# Patient Record
Sex: FEMALE | URBAN - METROPOLITAN AREA
[De-identification: names, ages, dates, MRNs, and addresses within clinical notes are randomized per-mention and may not be internally consistent; named-entity substitution may affect disease eponyms.]

---

## 2017-07-11 ENCOUNTER — APPOINTMENT (RX ONLY)
Dept: URBAN - METROPOLITAN AREA CLINIC 75 | Facility: CLINIC | Age: 47
Setting detail: DERMATOLOGY
End: 2017-07-11

## 2017-07-11 VITALS — HEIGHT: 64 IN | WEIGHT: 180 LBS

## 2017-07-11 DIAGNOSIS — L81.1 CHLOASMA: ICD-10-CM

## 2017-07-11 PROCEDURE — ? COUNSELING

## 2017-07-11 PROCEDURE — ? TREATMENT REGIMEN

## 2017-07-11 PROCEDURE — ? IN-HOUSE DISPENSING PHARMACY

## 2017-07-11 PROCEDURE — 99202 OFFICE O/P NEW SF 15 MIN: CPT

## 2017-07-11 PROCEDURE — ? RECOMMENDATIONS

## 2017-07-11 ASSESSMENT — LOCATION ZONE DERM: LOCATION ZONE: FACE

## 2017-07-11 ASSESSMENT — LOCATION DETAILED DESCRIPTION DERM: LOCATION DETAILED: LEFT INFERIOR CENTRAL MALAR CHEEK

## 2017-07-11 ASSESSMENT — LOCATION SIMPLE DESCRIPTION DERM: LOCATION SIMPLE: LEFT CHEEK

## 2017-07-11 NOTE — PROCEDURE: IN-HOUSE DISPENSING PHARMACY
Product 47 Price/Unit (In Dollars): 0
Product 15 Refills: 6
Product 68 Unit Type: mg
Product 7 Application Directions: Apply to affected area one time a day.
Name Of Product 51: Terb 5%/ DMSO Anti- Fungal Nail Solution - 982374
Product 11 Unit Type: cc's
Product 6 Amount/Unit (Numbers Only): 30
Name Of Product 8: Taza 0.1% Cream - 429442
Product 2 Application Directions: Apply to affected area in the evening after moisturizer.  Avoid eyelids.
Product 41 Application Directions: Apply to hyperpigmented area in the evening after moisturizer.
Name Of Product 13: Clob 0.05% Cream - 645443
Name Of Product 2: Tret 0.05% Cream - 870032
Product 1 Amount/Unit (Numbers Only): 120
Product 1 Price/Unit (In Dollars): 40.00
Product 35 Price/Unit (In Dollars): 50.00
Render Refills If Set To 0: Yes
Product 4 Refills: 11
Product 31 Amount/Unit (Numbers Only): 30
Name Of Product 11: Clob 0.05% Solution - 362228
Detail Level: Zone
Product 6 Unit Type: grams
Product 8 Application Directions: Apply to affected area in the evening after moisturizer.  Avoid eyelids.
Name Of Product 1: Sod Sulfa Body Wash - 166053
Product 8 Refills: 11
Product 13 Application Directions: Apply to affected area two times a day.
Product 3 Application Directions: Apply to acne prone area after moisturizer one time a day.
Product 51 Application Directions: Apply to affected nails daily for 10 months.
Product 5 Application Directions: Apply to affected area in evening after moisturizer. Avoid eyelids.
Product 41 Units Dispensed: 1
Name Of Product 24: Triamcin 1% Ointment - 178882
Product 15 Application Directions: apply to affected area bid
Product 21 Application Directions: Apply to affected area in the evening or every other evening.
Name Of Product 7: Sod Sulf 10% / Sulf 2%  cream- 581521
Name Of Product 6: Adap  0.3%/BPO Combo Cream - 441751
Name Of Product 42: Kojic Melasma Cream - 615520
Product 31 Application Directions: Apply to affected area before moisturizer one time a day.
Name Of Product 5: Clind / Tret Combo Cream - 717285
Name Of Product 14: Clob 0.05% Dermatitis Topical Foam - 246117
Name Of Product 4: Acne Gel w/ Dapsone 7.5% - 546087
Name Of Product 21: Imiquim 5% / Levo 1% Gel - 690850
Product 15 Amount/Unit (Numbers Only): 60
Name Of Product 12: Iodoquin / Ameya Combo - 012853
Product 51 Amount/Unit (Numbers Only): 15
Product 7 Amount/Unit (Numbers Only): 60
Name Of Product 35: Tacro 0.1% Ointment - 738195
Product 6 Application Directions: Apply to affected area after moisturizer one time a day.
Name Of Product 41: Hydroquin 6%/ Tret 0.05% Combo Cream - 308542
Product 1 Application Directions: Use as face or body wash daily.
Name Of Product 15: Triam 0.1%/Calcip 0.005% Psoriasis Ointment- 627957
Name Of Product 31: Ivermec 1% / Met 1%/ Pot Azel Gel - 941540
Product 14 Application Directions: Apply to affected area one to two times a day.
Product 14 Amount/Unit (Numbers Only): 50
Name Of Product 3: Giovanny / Clind Combo - 445136
Name Of Product 16: Tacro 0.1% ointment-729715

## 2017-07-11 NOTE — PROCEDURE: RECOMMENDATIONS
Detail Level: Zone
Recommendation Preamble: The following recommendations were made during the visit: microneedling, peels,

## 2017-08-15 ENCOUNTER — APPOINTMENT (RX ONLY)
Dept: URBAN - METROPOLITAN AREA CLINIC 76 | Facility: CLINIC | Age: 47
Setting detail: DERMATOLOGY
End: 2017-08-15

## 2017-08-15 DIAGNOSIS — L81.1 CHLOASMA: ICD-10-CM

## 2017-08-15 PROCEDURE — ? CHEMICAL PEEL

## 2017-08-15 ASSESSMENT — LOCATION DETAILED DESCRIPTION DERM: LOCATION DETAILED: LEFT MEDIAL FOREHEAD

## 2017-08-15 ASSESSMENT — LOCATION SIMPLE DESCRIPTION DERM: LOCATION SIMPLE: LEFT FOREHEAD

## 2017-08-15 ASSESSMENT — LOCATION ZONE DERM: LOCATION ZONE: FACE

## 2017-08-15 NOTE — PROCEDURE: CHEMICAL PEEL
Detail Level: Zone
Post Peel Care: After the procedure, a post-peel cream was applied to the treated areas. Sun protection and post-care instructions were reviewed with the patient.
Price (Use Numbers Only, No Special Characters Or $): 100
Treatment Time (Optional): 5
Post-Care Instructions: I reviewed with the patient in detail post-care instructions. Patient should avoid sun exposure and wear sun protection.
Prep: The treated area was degreased with pre-peel cleanser, and vaseline was applied for protection of mucous membranes.
Consent: Prior to the procedure, written consent was obtained and risks were reviewed, including but not limited to: redness, peeling, blistering, pigmentary change, scarring, infection, and pain.
Chemical Peel: Pyruvic Acid
Treatment Number: 0